# Patient Record
Sex: MALE | Race: WHITE | NOT HISPANIC OR LATINO | URBAN - METROPOLITAN AREA
[De-identification: names, ages, dates, MRNs, and addresses within clinical notes are randomized per-mention and may not be internally consistent; named-entity substitution may affect disease eponyms.]

---

## 2023-06-16 ENCOUNTER — OFFICE (OUTPATIENT)
Dept: URBAN - METROPOLITAN AREA CLINIC 102 | Facility: CLINIC | Age: 58
End: 2023-06-16

## 2023-06-16 VITALS
DIASTOLIC BLOOD PRESSURE: 92 MMHG | TEMPERATURE: 98.3 F | HEART RATE: 79 BPM | SYSTOLIC BLOOD PRESSURE: 145 MMHG | HEIGHT: 72 IN | WEIGHT: 201 LBS

## 2023-06-16 DIAGNOSIS — Z12.11 ENCOUNTER FOR SCREENING FOR MALIGNANT NEOPLASM OF COLON: ICD-10-CM

## 2023-06-16 DIAGNOSIS — K21.9 GASTRO-ESOPHAGEAL REFLUX DISEASE WITHOUT ESOPHAGITIS: ICD-10-CM

## 2023-06-16 DIAGNOSIS — J30.2 OTHER SEASONAL ALLERGIC RHINITIS: ICD-10-CM

## 2023-06-16 DIAGNOSIS — G47.33 OBSTRUCTIVE SLEEP APNEA (ADULT) (PEDIATRIC): ICD-10-CM

## 2023-06-16 DIAGNOSIS — R11.10 VOMITING, UNSPECIFIED: ICD-10-CM

## 2023-06-16 DIAGNOSIS — R13.10 DYSPHAGIA, UNSPECIFIED: ICD-10-CM

## 2023-06-16 PROCEDURE — 99204 OFFICE O/P NEW MOD 45 MIN: CPT | Performed by: PHYSICIAN ASSISTANT

## 2023-06-16 RX ORDER — OMEPRAZOLE 20 MG/1
20 CAPSULE, DELAYED RELEASE ORAL
Qty: 30 | Refills: 2 | Status: COMPLETED
Start: 2023-06-16 | End: 2023-08-02

## 2023-06-16 NOTE — SERVICEHPINOTES
Mr. Ybarra is a 58 YoM with hx GERD, Schatzki ring, seasonal allergies, HELDER, presents to the office for evaluation of recurrent dysphagia. He has noted some issues with swallowing intermittently in the last year, with significant worsening and episodes of regurgitation with solids (specifically meats) in the last 3 months. He does experience frequent reflux symptoms but only takes 1 OTC prilosec tablet every few weeks. He tries to control with diet/lifestyle. Pertinent negatives: N/V, abd pain, melena, rectal bleeding, unintentional WL.
brHe had a prior E[place] in 2015 with [place] [facility] (s/p dilation), otherwise unremarkable. br
CRC screening - patient has routine Cologuard testing with PCP office.
br
Noted he developed new redness of lateral left eye in the last few days - no pain or alterations in vision.
br
He has HELDER, no other pulm conditions. No cardiac hx. He takes aspirin intermittently (primary prevention), no AC or other NSAID use. No hx reactions to anesthesia.

## 2023-08-02 ENCOUNTER — OFFICE (OUTPATIENT)
Dept: URBAN - METROPOLITAN AREA CLINIC 98 | Facility: CLINIC | Age: 58
End: 2023-08-02

## 2023-08-02 VITALS
HEART RATE: 82 BPM | DIASTOLIC BLOOD PRESSURE: 92 MMHG | TEMPERATURE: 97.9 F | DIASTOLIC BLOOD PRESSURE: 76 MMHG | DIASTOLIC BLOOD PRESSURE: 87 MMHG | TEMPERATURE: 98.1 F | OXYGEN SATURATION: 99 % | SYSTOLIC BLOOD PRESSURE: 128 MMHG | RESPIRATION RATE: 21 BRPM | HEIGHT: 72 IN | RESPIRATION RATE: 20 BRPM | SYSTOLIC BLOOD PRESSURE: 124 MMHG | OXYGEN SATURATION: 96 % | DIASTOLIC BLOOD PRESSURE: 80 MMHG | HEART RATE: 66 BPM | SYSTOLIC BLOOD PRESSURE: 126 MMHG | RESPIRATION RATE: 18 BRPM | SYSTOLIC BLOOD PRESSURE: 171 MMHG | OXYGEN SATURATION: 93 % | WEIGHT: 210 LBS | RESPIRATION RATE: 16 BRPM | HEART RATE: 64 BPM | DIASTOLIC BLOOD PRESSURE: 81 MMHG | OXYGEN SATURATION: 98 % | SYSTOLIC BLOOD PRESSURE: 155 MMHG | HEART RATE: 65 BPM | SYSTOLIC BLOOD PRESSURE: 137 MMHG | HEART RATE: 71 BPM | DIASTOLIC BLOOD PRESSURE: 91 MMHG

## 2023-08-02 DIAGNOSIS — R13.19 OTHER DYSPHAGIA: ICD-10-CM

## 2023-08-02 DIAGNOSIS — K22.10 ULCER OF ESOPHAGUS WITHOUT BLEEDING: ICD-10-CM

## 2023-08-02 DIAGNOSIS — K20.80 OTHER ESOPHAGITIS WITHOUT BLEEDING: ICD-10-CM

## 2023-08-02 DIAGNOSIS — K22.2 ESOPHAGEAL OBSTRUCTION: ICD-10-CM

## 2023-08-02 PROCEDURE — 43220 ESOPHAGOSCOPY BALLOON <30MM: CPT | Performed by: INTERNAL MEDICINE

## 2023-08-02 RX ORDER — OMEPRAZOLE 20 MG/1
20 CAPSULE, DELAYED RELEASE ORAL
Qty: 30 | Refills: 2 | Status: COMPLETED
Start: 2023-06-16 | End: 2023-08-02

## 2023-08-02 RX ORDER — PANTOPRAZOLE SODIUM 40 MG/1
TABLET, DELAYED RELEASE ORAL
Qty: 60 | Refills: 3 | Status: ACTIVE
Start: 2023-08-02

## 2023-09-13 ENCOUNTER — OFFICE (OUTPATIENT)
Dept: URBAN - METROPOLITAN AREA PATHOLOGY 18 | Facility: PATHOLOGY | Age: 58
End: 2023-09-13

## 2023-09-13 ENCOUNTER — OFFICE (OUTPATIENT)
Dept: URBAN - METROPOLITAN AREA CLINIC 98 | Facility: CLINIC | Age: 58
End: 2023-09-13

## 2023-09-13 VITALS
HEART RATE: 54 BPM | HEART RATE: 56 BPM | SYSTOLIC BLOOD PRESSURE: 128 MMHG | HEART RATE: 59 BPM | OXYGEN SATURATION: 97 % | SYSTOLIC BLOOD PRESSURE: 122 MMHG | DIASTOLIC BLOOD PRESSURE: 87 MMHG | HEIGHT: 72 IN | RESPIRATION RATE: 19 BRPM | OXYGEN SATURATION: 99 % | OXYGEN SATURATION: 98 % | RESPIRATION RATE: 16 BRPM | SYSTOLIC BLOOD PRESSURE: 132 MMHG | DIASTOLIC BLOOD PRESSURE: 68 MMHG | DIASTOLIC BLOOD PRESSURE: 78 MMHG | SYSTOLIC BLOOD PRESSURE: 124 MMHG | SYSTOLIC BLOOD PRESSURE: 131 MMHG | HEART RATE: 57 BPM | OXYGEN SATURATION: 95 % | DIASTOLIC BLOOD PRESSURE: 80 MMHG | WEIGHT: 210 LBS | SYSTOLIC BLOOD PRESSURE: 127 MMHG | DIASTOLIC BLOOD PRESSURE: 82 MMHG | TEMPERATURE: 97.9 F | RESPIRATION RATE: 17 BRPM | HEART RATE: 62 BPM

## 2023-09-13 DIAGNOSIS — R13.10 DYSPHAGIA, UNSPECIFIED: ICD-10-CM

## 2023-09-13 DIAGNOSIS — K22.2 ESOPHAGEAL OBSTRUCTION: ICD-10-CM

## 2023-09-13 DIAGNOSIS — K21.9 GASTRO-ESOPHAGEAL REFLUX DISEASE WITHOUT ESOPHAGITIS: ICD-10-CM

## 2023-09-13 DIAGNOSIS — K21.00 GASTRO-ESOPHAGEAL REFLUX DISEASE WITH ESOPHAGITIS, WITHOUT B: ICD-10-CM

## 2023-09-13 PROBLEM — K31.5 OBSTRUCTION OF DUODENUM: Status: ACTIVE | Noted: 2023-09-13

## 2023-09-13 PROCEDURE — 88305 TISSUE EXAM BY PATHOLOGIST: CPT | Performed by: PATHOLOGY

## 2023-09-13 PROCEDURE — 88312 SPECIAL STAINS GROUP 1: CPT | Performed by: PATHOLOGY

## 2023-09-13 RX ORDER — MULTIVIT-MIN/IRON FUM/FOLIC AC 7.5 MG-4
TABLET ORAL
Qty: 0 | Refills: 0 | Status: COMPLETED
End: 2023-09-13

## 2023-09-13 RX ORDER — PANTOPRAZOLE SODIUM 40 MG/1
TABLET, DELAYED RELEASE ORAL
Qty: 30 | Refills: 5 | Status: ACTIVE
Start: 2023-09-13